# Patient Record
Sex: FEMALE | Race: OTHER | HISPANIC OR LATINO
[De-identification: names, ages, dates, MRNs, and addresses within clinical notes are randomized per-mention and may not be internally consistent; named-entity substitution may affect disease eponyms.]

---

## 2018-07-12 PROBLEM — E55.9 VITAMIN D DEFICIENCY: Status: ACTIVE | Noted: 2018-07-12

## 2018-07-16 ENCOUNTER — APPOINTMENT (OUTPATIENT)
Dept: HEMATOLOGY ONCOLOGY | Facility: CLINIC | Age: 48
End: 2018-07-16
Payer: COMMERCIAL

## 2018-07-16 ENCOUNTER — LABORATORY RESULT (OUTPATIENT)
Age: 48
End: 2018-07-16

## 2018-07-16 VITALS
TEMPERATURE: 98.8 F | HEART RATE: 83 BPM | BODY MASS INDEX: 37.38 KG/M2 | SYSTOLIC BLOOD PRESSURE: 124 MMHG | DIASTOLIC BLOOD PRESSURE: 77 MMHG | HEIGHT: 61 IN | WEIGHT: 198 LBS

## 2018-07-16 DIAGNOSIS — E55.9 VITAMIN D DEFICIENCY, UNSPECIFIED: ICD-10-CM

## 2018-07-16 DIAGNOSIS — Z86.79 PERSONAL HISTORY OF OTHER DISEASES OF THE CIRCULATORY SYSTEM: ICD-10-CM

## 2018-07-16 DIAGNOSIS — R79.89 OTHER SPECIFIED ABNORMAL FINDINGS OF BLOOD CHEMISTRY: ICD-10-CM

## 2018-07-16 DIAGNOSIS — D50.0 IRON DEFICIENCY ANEMIA SECONDARY TO BLOOD LOSS (CHRONIC): ICD-10-CM

## 2018-07-16 PROCEDURE — 36415 COLL VENOUS BLD VENIPUNCTURE: CPT

## 2018-07-16 PROCEDURE — 99205 OFFICE O/P NEW HI 60 MIN: CPT | Mod: 25

## 2018-07-16 PROCEDURE — 85025 COMPLETE CBC W/AUTO DIFF WBC: CPT

## 2018-07-17 LAB
ANION GAP SERPL CALC-SCNC: 16 MMOL/L
BUN SERPL-MCNC: 9 MG/DL
CALCIUM SERPL-MCNC: 8.7 MG/DL
CHLORIDE SERPL-SCNC: 102 MMOL/L
CO2 SERPL-SCNC: 22 MMOL/L
CREAT SERPL-MCNC: 0.78 MG/DL
CRP SERPL-MCNC: 0.5 MG/DL
DEPRECATED KAPPA LC FREE/LAMBDA SER: 0.64 RATIO
ERYTHROCYTE [SEDIMENTATION RATE] IN BLOOD BY WESTERGREN METHOD: 20 MM/HR
FERRITIN SERPL-MCNC: 74 NG/ML
FOLATE SERPL-MCNC: >20 NG/ML
GLUCOSE SERPL-MCNC: 104 MG/DL
HCT VFR BLD CALC: 35.3 %
HGB BLD-MCNC: 11.1 G/DL
IRON SATN MFR SERPL: 11 %
IRON SERPL-MCNC: 36 UG/DL
KAPPA LC CSF-MCNC: 1.98 MG/DL
KAPPA LC SERPL-MCNC: 1.27 MG/DL
M PROTEIN SPEC IFE-MCNC: NORMAL
MCHC RBC-ENTMCNC: 24.2 PG
MCHC RBC-ENTMCNC: 31.3 GM/DL
MCV RBC AUTO: 77.3 FL
PLATELET # BLD AUTO: 240 K/UL
POTASSIUM SERPL-SCNC: 3.7 MMOL/L
RBC # BLD: 4.57 M/UL
RBC # FLD: 18.1 %
SODIUM SERPL-SCNC: 140 MMOL/L
TIBC SERPL-MCNC: 319 UG/DL
UIBC SERPL-MCNC: 283 UG/DL
VIT B12 SERPL-MCNC: 251 PG/ML
WBC # FLD AUTO: 7.1 K/UL

## 2018-07-23 ENCOUNTER — APPOINTMENT (OUTPATIENT)
Dept: INFUSION THERAPY | Facility: CLINIC | Age: 48
End: 2018-07-23
Payer: COMMERCIAL

## 2018-07-23 VITALS
WEIGHT: 200 LBS | SYSTOLIC BLOOD PRESSURE: 120 MMHG | DIASTOLIC BLOOD PRESSURE: 77 MMHG | BODY MASS INDEX: 37.76 KG/M2 | HEART RATE: 69 BPM | TEMPERATURE: 98.8 F | HEIGHT: 61 IN

## 2018-07-23 PROCEDURE — 96372 THER/PROPH/DIAG INJ SC/IM: CPT | Mod: 59

## 2018-07-23 PROCEDURE — 96365 THER/PROPH/DIAG IV INF INIT: CPT

## 2018-07-23 PROCEDURE — 96367 TX/PROPH/DG ADDL SEQ IV INF: CPT

## 2018-07-30 ENCOUNTER — APPOINTMENT (OUTPATIENT)
Dept: INFUSION THERAPY | Facility: CLINIC | Age: 48
End: 2018-07-30
Payer: COMMERCIAL

## 2018-07-30 VITALS
HEART RATE: 67 BPM | SYSTOLIC BLOOD PRESSURE: 144 MMHG | DIASTOLIC BLOOD PRESSURE: 79 MMHG | BODY MASS INDEX: 37.57 KG/M2 | HEIGHT: 61 IN | WEIGHT: 199 LBS | TEMPERATURE: 98.3 F

## 2018-07-30 PROCEDURE — 96372 THER/PROPH/DIAG INJ SC/IM: CPT | Mod: 59,Q5

## 2018-07-30 PROCEDURE — 96365 THER/PROPH/DIAG IV INF INIT: CPT | Mod: Q5

## 2018-08-06 ENCOUNTER — APPOINTMENT (OUTPATIENT)
Dept: INFUSION THERAPY | Facility: CLINIC | Age: 48
End: 2018-08-06

## 2018-08-13 ENCOUNTER — APPOINTMENT (OUTPATIENT)
Dept: INFUSION THERAPY | Facility: CLINIC | Age: 48
End: 2018-08-13
Payer: COMMERCIAL

## 2018-08-13 VITALS
HEIGHT: 61 IN | TEMPERATURE: 98.4 F | WEIGHT: 197.13 LBS | HEART RATE: 64 BPM | DIASTOLIC BLOOD PRESSURE: 73 MMHG | SYSTOLIC BLOOD PRESSURE: 118 MMHG | BODY MASS INDEX: 37.22 KG/M2

## 2018-08-13 PROCEDURE — 96372 THER/PROPH/DIAG INJ SC/IM: CPT

## 2018-08-20 ENCOUNTER — APPOINTMENT (OUTPATIENT)
Dept: INFUSION THERAPY | Facility: CLINIC | Age: 48
End: 2018-08-20
Payer: COMMERCIAL

## 2018-08-20 ENCOUNTER — APPOINTMENT (OUTPATIENT)
Dept: INFUSION THERAPY | Facility: CLINIC | Age: 48
End: 2018-08-20

## 2018-08-20 VITALS
BODY MASS INDEX: 37.19 KG/M2 | HEART RATE: 63 BPM | SYSTOLIC BLOOD PRESSURE: 116 MMHG | WEIGHT: 197 LBS | DIASTOLIC BLOOD PRESSURE: 74 MMHG | HEIGHT: 61 IN | TEMPERATURE: 98.4 F

## 2018-08-20 PROCEDURE — 96372 THER/PROPH/DIAG INJ SC/IM: CPT

## 2018-08-27 ENCOUNTER — LABORATORY RESULT (OUTPATIENT)
Age: 48
End: 2018-08-27

## 2018-08-27 ENCOUNTER — APPOINTMENT (OUTPATIENT)
Dept: INFUSION THERAPY | Facility: CLINIC | Age: 48
End: 2018-08-27
Payer: COMMERCIAL

## 2018-08-27 ENCOUNTER — APPOINTMENT (OUTPATIENT)
Dept: HEMATOLOGY ONCOLOGY | Facility: CLINIC | Age: 48
End: 2018-08-27
Payer: COMMERCIAL

## 2018-08-27 VITALS
HEIGHT: 61 IN | BODY MASS INDEX: 37 KG/M2 | HEART RATE: 81 BPM | SYSTOLIC BLOOD PRESSURE: 118 MMHG | TEMPERATURE: 98.1 F | WEIGHT: 196 LBS | DIASTOLIC BLOOD PRESSURE: 73 MMHG

## 2018-08-27 LAB
HCT VFR BLD CALC: 41 %
HGB BLD-MCNC: 13 G/DL
MCHC RBC-ENTMCNC: 26.3 PG
MCHC RBC-ENTMCNC: 31.6 GM/DL
MCV RBC AUTO: 83.2 FL
PLATELET # BLD AUTO: 253 K/UL
RBC # BLD: 4.93 M/UL
RBC # FLD: 19.3 %
WBC # FLD AUTO: 8.1 K/UL

## 2018-08-27 PROCEDURE — 96372 THER/PROPH/DIAG INJ SC/IM: CPT | Mod: Q5

## 2018-08-27 PROCEDURE — 36415 COLL VENOUS BLD VENIPUNCTURE: CPT | Mod: Q5

## 2018-08-27 PROCEDURE — 85025 COMPLETE CBC W/AUTO DIFF WBC: CPT | Mod: Q5

## 2018-08-28 LAB
FERRITIN SERPL-MCNC: 155 NG/ML
IRON SATN MFR SERPL: 30 %
IRON SERPL-MCNC: 64 UG/DL
TIBC SERPL-MCNC: 216 UG/DL
UIBC SERPL-MCNC: 152 UG/DL
VIT B12 SERPL-MCNC: 552 PG/ML

## 2019-05-03 ENCOUNTER — APPOINTMENT (OUTPATIENT)
Age: 49
End: 2019-05-03
Payer: COMMERCIAL

## 2019-05-03 ENCOUNTER — LABORATORY RESULT (OUTPATIENT)
Age: 49
End: 2019-05-03

## 2019-05-03 VITALS
WEIGHT: 189.5 LBS | DIASTOLIC BLOOD PRESSURE: 76 MMHG | SYSTOLIC BLOOD PRESSURE: 126 MMHG | BODY MASS INDEX: 35.78 KG/M2 | TEMPERATURE: 97.9 F | HEIGHT: 61 IN | HEART RATE: 73 BPM

## 2019-05-03 DIAGNOSIS — D50.9 IRON DEFICIENCY ANEMIA, UNSPECIFIED: ICD-10-CM

## 2019-05-03 LAB
HCT VFR BLD CALC: 35.1 %
HGB BLD-MCNC: 11.1 G/DL
MCHC RBC-ENTMCNC: 26 PG
MCHC RBC-ENTMCNC: 31.7 GM/DL
MCV RBC AUTO: 81.8 FL
PLATELET # BLD AUTO: 284 K/UL
RBC # BLD: 4.29 M/UL
RBC # FLD: 13.8 %
WBC # FLD AUTO: 7.6 K/UL

## 2019-05-03 PROCEDURE — 99213 OFFICE O/P EST LOW 20 MIN: CPT | Mod: 25

## 2019-05-03 PROCEDURE — 85025 COMPLETE CBC W/AUTO DIFF WBC: CPT

## 2019-05-03 PROCEDURE — 36415 COLL VENOUS BLD VENIPUNCTURE: CPT

## 2019-05-03 RX ORDER — HYDROCHLOROTHIAZIDE 12.5 MG/1
12.5 CAPSULE ORAL
Qty: 30 | Refills: 0 | Status: DISCONTINUED | COMMUNITY
Start: 2019-01-22

## 2019-05-03 RX ORDER — FERROUS SULFATE TAB EC 325 MG (65 MG FE EQUIVALENT) 325 (65 FE) MG
325 (65 FE) TABLET DELAYED RESPONSE ORAL
Refills: 0 | Status: DISCONTINUED | COMMUNITY
End: 2019-05-03

## 2019-05-03 RX ORDER — VITAMIN C, CALCIUM, IRON, VITAMIN D3, VITAMIN E, THIAMIN, RIBOFLAVIN, NIACINAMIDE, VITAMIN B6, FOLIC ACID, IODINE, ZINC, COPPER, DOCUSATE SODIUM 120; 85; 30; 3; 20; 20; 1; 25; 2; 50; 159; 4.54; 150; 5; 400; 3.4 MG/1; MG/1; [IU]/1; MG/1; MG/1; MG/1; MG/1; MG/1; MG/1; MG/1; MG/1; MG/1; UG/1; MG/1; [IU]/1; MG/1
90-1 & 300 TABLET ORAL
Qty: 60 | Refills: 0 | Status: ACTIVE | COMMUNITY
Start: 2019-01-22

## 2019-05-03 NOTE — HISTORY OF PRESENT ILLNESS
[de-identified] : 49 F, , with iron deficiency microcytic anemia, treated with parenteral iron. [FreeTextEntry1] : Feraheme [de-identified] : Patient is returning for follow up. Her hemoglobin is 11.5 g/ dL, but ferritin has dropped to 5 mg/ mL. She is complaining of fatigue. She has received parenteral B12 and iron supplementation. Last dose of Feraheme was in July 2018. Patient continues to present regular, but heavy menses. She continues to work as a  at a local [employer]. Denies changes in weight or appetite.

## 2019-05-03 NOTE — REVIEW OF SYSTEMS
[Fatigue] : fatigue [Negative] : Integumentary [Recent Change In Weight] : ~T no recent weight change

## 2019-05-03 NOTE — RESULTS/DATA
[FreeTextEntry1] : March 20, 2019:\par WBC 5.4, hemoglobin 11.5 g/dL, hematocrit 36.7%, platelets 285,000, MCV 84.5, ferritin 5 ng / mL

## 2019-05-03 NOTE — ASSESSMENT
[FreeTextEntry1] : Ms. DEXTER 's questions were answered to her satisfaction. She expressed her understanding and willingness to comply with the above recommendations, and  will return to the office to review the results of the blood tests in 4-6 weeks.\par

## 2019-05-14 ENCOUNTER — APPOINTMENT (OUTPATIENT)
Age: 49
End: 2019-05-14
Payer: COMMERCIAL

## 2019-05-14 VITALS
BODY MASS INDEX: 35.78 KG/M2 | SYSTOLIC BLOOD PRESSURE: 119 MMHG | HEIGHT: 61 IN | WEIGHT: 189.5 LBS | HEART RATE: 70 BPM | TEMPERATURE: 98.3 F | DIASTOLIC BLOOD PRESSURE: 77 MMHG

## 2019-05-14 PROCEDURE — 96365 THER/PROPH/DIAG IV INF INIT: CPT

## 2019-05-21 ENCOUNTER — APPOINTMENT (OUTPATIENT)
Age: 49
End: 2019-05-21
Payer: COMMERCIAL

## 2019-05-21 VITALS
HEART RATE: 72 BPM | SYSTOLIC BLOOD PRESSURE: 116 MMHG | TEMPERATURE: 98 F | DIASTOLIC BLOOD PRESSURE: 74 MMHG | RESPIRATION RATE: 20 BRPM

## 2019-05-21 PROCEDURE — 96365 THER/PROPH/DIAG IV INF INIT: CPT | Mod: Q5

## 2019-06-18 ENCOUNTER — LABORATORY RESULT (OUTPATIENT)
Age: 49
End: 2019-06-18

## 2019-06-18 ENCOUNTER — APPOINTMENT (OUTPATIENT)
Age: 49
End: 2019-06-18
Payer: COMMERCIAL

## 2019-06-18 VITALS
BODY MASS INDEX: 34.81 KG/M2 | HEART RATE: 54 BPM | SYSTOLIC BLOOD PRESSURE: 124 MMHG | TEMPERATURE: 98 F | RESPIRATION RATE: 16 BRPM | WEIGHT: 184.38 LBS | DIASTOLIC BLOOD PRESSURE: 78 MMHG | HEIGHT: 61 IN

## 2019-06-18 LAB
HCT VFR BLD CALC: 41.4 %
HGB BLD-MCNC: 13 G/DL
MCHC RBC-ENTMCNC: 27.8 PG
MCHC RBC-ENTMCNC: 31.3 GM/DL
MCV RBC AUTO: 88.9 FL
PLATELET # BLD AUTO: 254 K/UL
RBC # BLD: 4.66 M/UL
RBC # FLD: 17.6 %
WBC # FLD AUTO: 7 K/UL

## 2019-06-18 PROCEDURE — 85025 COMPLETE CBC W/AUTO DIFF WBC: CPT

## 2019-06-18 PROCEDURE — 36415 COLL VENOUS BLD VENIPUNCTURE: CPT

## 2019-06-19 LAB
FERRITIN SERPL-MCNC: 318 NG/ML
IRON SATN MFR SERPL: 24 %
IRON SERPL-MCNC: 51 UG/DL
TIBC SERPL-MCNC: 211 UG/DL
UIBC SERPL-MCNC: 160 UG/DL

## 2020-08-18 ENCOUNTER — APPOINTMENT (OUTPATIENT)
Dept: UROLOGY | Facility: CLINIC | Age: 50
End: 2020-08-18
Payer: MEDICAID

## 2020-08-18 VITALS
WEIGHT: 195 LBS | TEMPERATURE: 97.3 F | HEART RATE: 66 BPM | OXYGEN SATURATION: 98 % | SYSTOLIC BLOOD PRESSURE: 137 MMHG | DIASTOLIC BLOOD PRESSURE: 85 MMHG | HEIGHT: 61 IN | BODY MASS INDEX: 36.82 KG/M2

## 2020-08-18 DIAGNOSIS — R31.21 ASYMPTOMATIC MICROSCOPIC HEMATURIA: ICD-10-CM

## 2020-08-18 PROCEDURE — 99204 OFFICE O/P NEW MOD 45 MIN: CPT

## 2020-08-18 NOTE — PHYSICAL EXAM
[General Appearance - Well Developed] : well developed [Well Groomed] : well groomed [Normal Appearance] : normal appearance [General Appearance - Well Nourished] : well nourished [General Appearance - In No Acute Distress] : no acute distress [Edema] : no peripheral edema [Exaggerated Use Of Accessory Muscles For Inspiration] : no accessory muscle use [Respiration, Rhythm And Depth] : normal respiratory rhythm and effort [Abdomen Soft] : soft [Normal Station and Gait] : the gait and station were normal for the patient's age [Costovertebral Angle Tenderness] : no ~M costovertebral angle tenderness [Abdomen Tenderness] : non-tender [Urinary Bladder Findings] : the bladder was normal on palpation [] : no rash [No Focal Deficits] : no focal deficits [Affect] : the affect was normal [Mood] : the mood was normal [Oriented To Time, Place, And Person] : oriented to person, place, and time [No Palpable Adenopathy] : no palpable adenopathy [Not Anxious] : not anxious

## 2020-08-20 NOTE — ASSESSMENT
[FreeTextEntry1] : Patient presents with asymptomatic microscopic hematuria. Labs and CAT scan are ordered. She will follow up in the office for a cystoscopy.

## 2020-08-20 NOTE — HISTORY OF PRESENT ILLNESS
[FreeTextEntry1] : This patient presents with a complaint of asymptomatic microscopic hematuria. She has no lower tract symptoms. She was found to have microscopic hematuria on her routine urinalysis with her primary care physician.

## 2020-08-22 DIAGNOSIS — Z01.818 ENCOUNTER FOR OTHER PREPROCEDURAL EXAMINATION: ICD-10-CM

## 2020-08-26 ENCOUNTER — APPOINTMENT (OUTPATIENT)
Dept: DISASTER EMERGENCY | Facility: CLINIC | Age: 50
End: 2020-08-26

## 2020-08-27 LAB — SARS-COV-2 N GENE NPH QL NAA+PROBE: NOT DETECTED

## 2020-08-31 ENCOUNTER — RESULT REVIEW (OUTPATIENT)
Age: 50
End: 2020-08-31

## 2020-09-16 ENCOUNTER — APPOINTMENT (OUTPATIENT)
Dept: UROLOGY | Facility: CLINIC | Age: 50
End: 2020-09-16

## 2020-10-20 ENCOUNTER — APPOINTMENT (OUTPATIENT)
Dept: UROLOGY | Facility: CLINIC | Age: 50
End: 2020-10-20

## 2020-10-28 ENCOUNTER — APPOINTMENT (OUTPATIENT)
Dept: ANTEPARTUM | Facility: CLINIC | Age: 50
End: 2020-10-28

## 2020-12-08 ENCOUNTER — ASOB RESULT (OUTPATIENT)
Age: 50
End: 2020-12-08

## 2020-12-08 ENCOUNTER — APPOINTMENT (OUTPATIENT)
Dept: ANTEPARTUM | Facility: CLINIC | Age: 50
End: 2020-12-08
Payer: MEDICAID

## 2020-12-08 PROCEDURE — 76830 TRANSVAGINAL US NON-OB: CPT

## 2020-12-08 PROCEDURE — 76856 US EXAM PELVIC COMPLETE: CPT | Mod: 59

## 2020-12-08 PROCEDURE — 99072 ADDL SUPL MATRL&STAF TM PHE: CPT

## 2021-06-08 ENCOUNTER — APPOINTMENT (OUTPATIENT)
Dept: ANTEPARTUM | Facility: CLINIC | Age: 51
End: 2021-06-08
Payer: MEDICAID

## 2021-06-08 ENCOUNTER — ASOB RESULT (OUTPATIENT)
Age: 51
End: 2021-06-08

## 2021-06-08 PROCEDURE — 76830 TRANSVAGINAL US NON-OB: CPT

## 2021-06-08 PROCEDURE — 76856 US EXAM PELVIC COMPLETE: CPT | Mod: 59

## 2023-07-18 ENCOUNTER — EMERGENCY (EMERGENCY)
Facility: HOSPITAL | Age: 53
LOS: 0 days | Discharge: HOME | End: 2023-07-18
Attending: STUDENT IN AN ORGANIZED HEALTH CARE EDUCATION/TRAINING PROGRAM
Payer: MEDICAID

## 2023-07-18 VITALS — WEIGHT: 186.07 LBS | HEIGHT: 63 IN

## 2023-07-18 VITALS
HEART RATE: 72 BPM | SYSTOLIC BLOOD PRESSURE: 161 MMHG | TEMPERATURE: 97 F | DIASTOLIC BLOOD PRESSURE: 91 MMHG | OXYGEN SATURATION: 99 % | RESPIRATION RATE: 19 BRPM
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
## 2023-07-18 DIAGNOSIS — I10 ESSENTIAL (PRIMARY) HYPERTENSION: ICD-10-CM

## 2023-07-18 DIAGNOSIS — Z23 ENCOUNTER FOR IMMUNIZATION: ICD-10-CM
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
## 2023-07-18 DIAGNOSIS — I83.891 VARICOSE VEINS OF RIGHT LOWER EXTREMITY WITH OTHER COMPLICATIONS: ICD-10-CM

## 2023-07-18 PROCEDURE — 99284 EMERGENCY DEPT VISIT MOD MDM: CPT | Mod: 25

## 2023-07-18 PROCEDURE — 99283 EMERGENCY DEPT VISIT LOW MDM: CPT | Mod: 25

## 2023-07-18 PROCEDURE — 12001 RPR S/N/AX/GEN/TRNK 2.5CM/<: CPT

## 2023-07-18 PROCEDURE — 90715 TDAP VACCINE 7 YRS/> IM: CPT

## 2023-07-18 PROCEDURE — 90471 IMMUNIZATION ADMIN: CPT

## 2023-07-18 RX ORDER — TETANUS TOXOID, REDUCED DIPHTHERIA TOXOID AND ACELLULAR PERTUSSIS VACCINE, ADSORBED 5; 2.5; 8; 8; 2.5 [IU]/.5ML; [IU]/.5ML; UG/.5ML; UG/.5ML; UG/.5ML
0.5 SUSPENSION INTRAMUSCULAR ONCE
Refills: 0 | Status: COMPLETED | OUTPATIENT
Start: 2023-07-18 | End: 2023-07-18

## 2023-07-18 RX ORDER — ACETAMINOPHEN 500 MG
650 TABLET ORAL ONCE
Refills: 0 | Status: COMPLETED | OUTPATIENT
Start: 2023-07-18 | End: 2023-07-18

## 2023-07-18 RX ADMIN — TETANUS TOXOID, REDUCED DIPHTHERIA TOXOID AND ACELLULAR PERTUSSIS VACCINE, ADSORBED 0.5 MILLILITER(S): 5; 2.5; 8; 8; 2.5 SUSPENSION INTRAMUSCULAR at 11:22

## 2023-07-18 RX ADMIN — Medication 650 MILLIGRAM(S): at 11:22

## 2023-07-18 NOTE — ED ADULT TRIAGE NOTE - CHIEF COMPLAINT QUOTE
pt presents to ED with foot and ankle pain s/p being hit with a door. pt endorses small laceration to interior right ankle. bleeding controlled in triage. endorses incident happened at approximately 0900.

## 2023-07-18 NOTE — ED ADULT NURSE NOTE - NSFALLUNIVINTERV_ED_ALL_ED
Bed/Stretcher in lowest position, wheels locked, appropriate side rails in place/Call bell, personal items and telephone in reach/Instruct patient to call for assistance before getting out of bed/chair/stretcher/Non-slip footwear applied when patient is off stretcher/[place] to call system/Physically safe environment - no spills, clutter or unnecessary equipment/Purposeful proactive rounding/Room/bathroom lighting operational, light cord in reach

## 2023-07-18 NOTE — ED STATDOCS - OBJECTIVE STATEMENT
: [id number]  54 y/o female w/ PMHx of HTN presents to the ED c/o R ankle laceration, bleeding, pain and bruising s/p hitting it with the corner of a door around 9am today. Unsure when her last tdap was. Pt is able to ambulate on her own.

## 2023-07-18 NOTE — ED STATDOCS - CLINICAL SUMMARY MEDICAL DECISION MAKING FREE TEXT BOX
Pt hit her foot on a door this morning, now with small chunk of skin missing that was bleeding, likely a varicose vein, bleeding now controlled. Plan: update tdap, wound care and d/c with return precautions for signs/symptoms of infection.

## 2023-07-18 NOTE — ED STATDOCS - NS ED ATTENDING STATEMENT MOD
This was a shared visit with the ROD. I reviewed and verified the documentation and independently performed the documented:

## 2023-07-18 NOTE — ED STATDOCS - ATTENDING APP SHARED VISIT CONTRIBUTION OF CARE
I, Adán Burns, DO personally saw the patient with ROD.  I have personally performed a face to face diagnostic evaluation on this patient.  I have reviewed the ROD note and agree with the history, exam, and plan of care, except as noted.  I personally saw the patient and performed a substantive portion of the visit including all aspects of the medical decision making.

## 2023-07-18 NOTE — ED STATDOCS - PROGRESS NOTE DETAILS
pt varicose vein dressed with Surgifoam and pressure dressing and advised to fu with pmd if needed. pt has no active bleeding in the ed. pt ambulating with no difficulty. pt well appearing on dc and agrees with plan. -Michelle Jung PA-C

## 2023-07-18 NOTE — ED STATDOCS - PATIENT PORTAL LINK FT
You can access the FollowMyHealth Patient Portal offered by [facility] by registering at the following website: [email or web address]. By joining [employer]’s FollowMyHealth portal, you will also be able to view your health information using other applications (apps) compatible with our system.

## 2023-07-29 ENCOUNTER — EMERGENCY (EMERGENCY)
Facility: HOSPITAL | Age: 53
LOS: 0 days | Discharge: HOME | End: 2023-07-29
Attending: STUDENT IN AN ORGANIZED HEALTH CARE EDUCATION/TRAINING PROGRAM
Payer: MEDICAID

## 2023-07-29 VITALS
DIASTOLIC BLOOD PRESSURE: 78 MMHG | TEMPERATURE: 99 F | SYSTOLIC BLOOD PRESSURE: 151 MMHG | OXYGEN SATURATION: 99 % | RESPIRATION RATE: 18 BRPM | HEART RATE: 65 BPM

## 2023-07-29 VITALS — WEIGHT: 186.07 LBS | HEIGHT: 63 IN

## 2023-07-29 DIAGNOSIS — I83.891 VARICOSE VEINS OF RIGHT LOWER EXTREMITY WITH OTHER COMPLICATIONS: ICD-10-CM

## 2023-07-29 DIAGNOSIS — I10 ESSENTIAL (PRIMARY) HYPERTENSION: ICD-10-CM

## 2023-07-29 PROCEDURE — 99282 EMERGENCY DEPT VISIT SF MDM: CPT | Mod: 25

## 2023-07-29 PROCEDURE — 12001 RPR S/N/AX/GEN/TRNK 2.5CM/<: CPT

## 2023-07-29 PROCEDURE — 99284 EMERGENCY DEPT VISIT MOD MDM: CPT | Mod: 25

## 2023-07-29 NOTE — ED PROCEDURE NOTE - PROCEDURE ADDITIONAL DETAILS
Bacitracin + Surgifoam + Telfa + kerlix applied. +Hemostasis. Patient stable, tolerated well. ~Juan Ramon Boo PA-C

## 2023-07-29 NOTE — ED ADULT TRIAGE NOTE - GLASGOW COMA SCALE: BEST MOTOR RESPONSE, MLM
(M6) obeys commands
decreased ability to use arms for pushing/pulling/decreased ability to use legs for bridging/pushing

## 2023-07-29 NOTE — ED STATDOCS - CLINICAL SUMMARY MEDICAL DECISION MAKING FREE TEXT BOX
53-year-old female was seen here in the ER last week for 1 cm laceration to the right foot presents for repeat episode of bleeding.  Patient noted to have high-pressure bleeding, likely varicose vein versus small arterial insult.  Patient not on any anticoagulation.  Will place 1 stitch to control bleeding, discharge home. 53-year-old female was seen here in the ER last week for 1 cm laceration to the right foot presents for repeat episode of bleeding.  Patient noted to have high-pressure bleeding, likely varicose vein versus small arterial insult.  Patient not on any anticoagulation.  Will place 1 stitch to control bleeding, discharge home.    PA note: Bleeding varicose vein repaired with 3 3-0 plain gut sutures, see procedure note. All labwork/radiology results discussed in detail with patient. Patient re-examined and re-evaluated. Patient feels much better at this time. ED evaluation, Diagnosis and management discussed with the patient in detail. Workup results discussed with ED attending, OK to dc home. Close vascular MD follow up encouraged, aftercare to assist with scheduling appointment ASAP. Strict ED return instructions discussed in detail and patient given the opportunity to ask any questions about their discharge diagnosis and instructions. Patient verbalized understanding. ~ Juan Ramon Boo PA-C

## 2023-07-29 NOTE — ED STATDOCS - PATIENT PORTAL LINK FT
You can access the FollowMyHealth Patient Portal offered by [facility] by registering at the following website: [email or web address]. By joining [facility]’s FollowMyHealth portal, you will also be able to view your health information using other applications (apps) compatible with our system.

## 2023-07-29 NOTE — ED ADULT NURSE NOTE - CHIEF COMPLAINT QUOTE
ID: [id number] - PT C/O RIGHT ANKLE PAIN/BLEEDING STARTED THIS MORNING, PT REPORTS ANKLE/FOOT INJURY 1 WEEK AGO AT WORK, HIT ANKLE/FOOT TO EDGE OF DOOR.  UNABLE TO EVAL AFFECTED AREA.

## 2023-07-29 NOTE — ED ADULT TRIAGE NOTE - CHIEF COMPLAINT QUOTE
ID: [id number] - PT C/O RIGHT ANKLE PAIN/BLEEDING STARTED THIS MORNING, PT REPORTS ANKLE/FOOT INJURY 1 WEEK AGO AT WORK, HIT ANKLE/FOOT TO EDGE OF DOOR.  UNABLE TO EVAL AFFECTED AREA.

## 2023-07-29 NOTE — ED STATDOCS - CARE PROVIDER_API CALL
Gerardo Padilla  Vascular Surgery  [address], [place] [address]  Phone: [phone]  Fax: [phone]  Follow Up Time: Urgent

## 2023-07-29 NOTE — ED STATDOCS - SKIN, MLM
RIGHT ANKLE: +Multiple small varicose veins. Small area of venous oozing of blood noted right medial ankle varicose vein. ~Juan Ramon Boo PA-C

## 2023-07-29 NOTE — ED STATDOCS - NS ED ROS FT
GENERAL: no fever, no chills, no weight loss  EYES: no change in vision, no irritation, no discharge, no redness, no pain  HEENT: no trouble swallowing or speaking, no throat pain, no ear pain  CARDIAC: no chest pain, no palpitations   PULMONARY: no cough, no shortness of breath, no wheezing  GI: no abdominal pain, no nausea, no vomiting, no diarrhea, no constipation, no melena, no hematochezia, no hematemesis  : no changes in urination, no dysuria, no frequency, no hematuria, no discharge  SKIN: no rashes, right ankle with small laceration with active bleeding.   NEURO: no headache, no numbness, no weakness  MSK: +right ankle pain, no muscle pain, no back pain, no calf pain

## 2023-07-29 NOTE — ED STATDOCS - NSFOLLOWUPINSTRUCTIONS_ED_ALL_ED_FT
Venas varicosas que sangran  Bleeding Varicose Veins  Las venas varicosas son aquellas que se ingram agrandado y tornado sinuosas debido a las válvulas dañadas en las venas. Las válvulas de las venas ayudan al retorno de la shannon desde la vena hacia el corazón. Si estas válvulas se dañan, la shannon retrocede y regresa a las venas. [place] puede hacer que las venas revienten y sangren debido al aumento de la presión interior.    El sangrado de las venas varicosas puede ser interno o externo. El sangrado externo es un sangrado fuera de la piel que puede deberse a un bryce o rasguño en la piel que está sobre la vena varicosa. El sangrado interno se produce debajo de la piel y a menudo es el resultado de un traumatismo directo en la vena varicosa, ke por tropezar, caer o golpear la vena con un mueble.    El sangrado intradérmico es otra forma de sangrado que puede ocurrir en las venas varicosas pequeñas a las que se llama arañas. En esta situación, la ruptura de la araña se produce debajo de la superficie de la piel, creando un moretón aliya violáceo que puede verse a través de la piel. S[place]etha problema suele ser leve y generalmente mejora por sí solo sin tratamiento.    ¿Cuáles son las causas?  Esta afección puede ser causada por un bryce o un golpe directo en la piel que está sobre la vena varicosa. En algunos casos, el sangrado puede producirse sin traumatismo directo en la vena. [place] puede deberse a lo siguiente:  Adelgazamiento y dilatación de la piel que recubre las venas varicosas (hipotonía).  Martin de las venas finas y débiles.  Hipertensión arterial en las venas, debido a la acumulación de shannon que normalmente debería retornar al corazón.  Indira protuberancia en la pelvis (bulto pélvico) que afecta las venas de las piernas.  Embarazo y parto.  Coágulos sanguíneos, especialmente en las venas profundas (tromboflebitis).  ¿Qué incrementa el riesgo?  Es más probable que tenga esta afección si:  Tiene antecedentes familiares de venas varicosas.  Está de pie nader largos períodos de tiempo.  Está embarazada o tuvo un embarazo.  Tiene sobrepeso.  Usa pastillas anticonceptivas (anticonceptivos orales).  Tiene un estilo de hitesh inactivo (sedentario).  Tiene antecedentes de trombosis venosa profunda (TVP).  ¿Cuáles son los signos o síntomas?  Si el sangrado se produce internamente, o debajo de la piel, los síntomas pueden incluir:  Kimberly de color aliya o púrpura en la piel que se extiende hacia fuera de las venas.  Picazón y cambio de color en la piel.  Pesadez, dolor punzante y pulsante, y calambres en las piernas, especialmente después de estar de pie por largos períodos, por el uso de ropa apretada o por sentir calor.  Sequedad intensa en la piel (eczema varicoso).  Sensación de ardor.  Mareos y, a veces, desmayos.  El sangrado externo se produce en la superficie de la piel, con mayor frecuencia después de un bryce, rasguño, moretón u otro traumatismo físico. Cuando esto ocurre, es crucial ejercer presión sobre la vena y detener el sangrado.    ¿Cómo se diagnostica?  Esta afección puede diagnosticarse en función de los síntomas, los que incluyen cuándo notó el sangrado o sintió dolor por primera vez.    ¿Cómo se trata?  El tratamiento puede incluir:  Detener el sangrado aplicando presión sobre la vena con indira toalla, sulma o venda limpias.  Detener la hinchazón aplicando presión (compresión) en la kimberly nader un período de tiempo más prolongado. Para esto se aplican vendas elásticas o se usan medias de compresión.  Levantar (elevar) la pierna por encima del nivel del corazón, nader 30 minutos algunas veces al día.  Siga estas instrucciones en espinoza casa:  Compression stockings on a person's lower legs.  Medicamentos    [place] y use los medicamentos y las cremas de venta mickey y recetados solamente ke se lo haya indicado el médico.  Si le recetaron indira crema antibiótica, úsela ke se lo haya indicado el médico. No deje de usar el antibiótico aunque la afección mejore.  Estilo de hitesh    A couple holding hands and walking.  No consuma ningún producto que contenga nicotina o tabaco. Estos productos incluyen cigarrillos, tabaco para mascar y aparatos de vapeo, ke los cigarrillos electrónicos. Si necesita ayuda para dejar de fumar, consulte al médico.  Realice ejercicio con regularidad y danielle los ejercicios ke se lo haya indicado el médico.  Si debe perder peso, hable con espinoza médico.  Instrucciones generales    Use medias de compresión o colóquese vendas elásticas o cualquier vendaje ke se lo haya indicado el médico. Estas ayudan a evitar la formación de coágulos de shannon y a reducir la hinchazón de las piernas.  Trate de no estar sentado o de pie nader largos períodos. Si necesita estar sentado o de pie por mucho tiempo, muévase con frecuencia para mantener el flujo sanguíneo (circulación).  Eleve las piernas por encima del nivel del corazón nader 30 minutos 4 veces al día, o con la frecuencia que le hayan indicado. Para hacerlo, recuéstese con las piernas apoyadas sobre indira almohada o almohadón para que los pies permanezcan por encima del nivel del corazón. Hacer esto regularmente puede ayudar a evitar más sangrado.  Revise la piel todos los días para detectar nuevas llagas y signos de sangrado.  Evite usar tacos altos y ropa apretada, especialmente en las extremidades y la cintura.  Tenga cuidado en situaciones en las que se podría lastimar las piernas, por ejemplo cuando se rasura o trabaja en el jardín. [place] puede ayudar a evitar el sangrado.  Comuníquese con un médico si:  Las venas sangran por encima o por debajo de la piel.  Espinoza dolor empeora.  La kimberly en torno a la vena varicosa está ??caliente, mikhail o sensible al tacto.  Aparecen llegas nuevas o indira erupción cutánea cerca de las venas varicosas.  Tiene indira llaga que no se marcie, se infecta o se agranda.  Tiene un líquido amarillento que huele mal, que emana del lugar donde estaba el sangrando externo.  Tiene fiebre.  Solicite ayuda de inmediato si:  Siente dolor en el pecho.  Tiene dificultad para respirar.  Siente dolor intenso en la pierna.  Las piernas y los pies adquieren un color aliya o marcos.  Las piernas se hinchan y se endurecen.  Tiene sangrado de las venas varicosas que no se detiene.  Tiene mareos o se ha desmayado.  Resumen  Las venas varicosas son aquellas que se ingram agrandado y tornado sinuosas debido a las válvulas dañadas en las venas. Pueden sangrar debajo de la piel (internamente) o en la superficie (externamente).  El tratamiento puede incluir ejercer presión sobre la vena, elevación de las piernas, uso de medias de compresión y cambios en el estilo de hitesh.  Debería hacer actividad física regularmente, mantener un peso saludable y evitar fumar.  Controle espinoza piel todos los días para detectar nuevas llagas, signos de sangrado u otros problemas.  Esta información no tiene ke fin reemplazar el consejo del médico. Asegúrese de hacerle al médico cualquier pregunta que tenga.      Care For Your Absorbable Stitches    WHAT YOU NEED TO KNOW:    What are absorbable stitches? Absorbable stitches, or sutures, are used to close cuts or wounds. These stitches are absorbed by your body, or fall off on their own within days or weeks. They do not need to be removed.      How do I care for my absorbable stitches?    Protect the stitches. Wear protective clothing over the stitches, and protect the area from sunlight. Do not place pressure on your wound. This could open your wound and increase your risk for an infection.    Clean your wound as directed. Carefully wash the wound with soap and water. Gently dry the area and put on new, clean bandages as directed. Change your bandages when they get wet or dirty. Check your wound for signs of infection when you clean it. Signs include redness, swelling, and pus.    Keep the area dry as directed. Wait 12 to 24 hours after you receive your stitches before you take a shower. Take showers instead of baths. Do not take a bath or swim. Your healthcare provider will give you instructions for bathing with your stitches.  What can I do to help my wound heal?    Elevate your wound. Keep your wound above the level of your heart as often as you can. This will help decrease swelling and pain. Prop the area on pillows or blankets, if possible, to keep it elevated comfortably.    Limit activity. Do not stretch the skin around your wound. This will help prevent bleeding and swelling.  When should I seek immediate care?    Your wound comes apart.    You have red streaks around your wound.    You have swollen or painful lymph nodes.    Blood soaks through your bandage.  When should I contact my healthcare provider?    You have signs of an infection, such as increased redness, pain, swelling, or pus.    You have a fever.    Your stitches do not absorb when your healthcare provider says they should.    You have questions or concerns about your condition or care.  CARE AGREEMENT:    You have the right to help plan your care. Learn about your health condition and how it may be treated. Discuss treatment options with your healthcare providers to decide what care you want to receive. You always have the right to refuse treatment.

## 2023-07-29 NOTE — ED STATDOCS - PROGRESS NOTE DETAILS
PA note: All labwork/radiology results discussed in detail with patient. Patient re-examined and re-evaluated. Patient feels much better at this time. ED evaluation, Diagnosis and management discussed with the patient in detail. Workup results discussed with ED attending, OK to [place] home. Close PMD follow up encouraged, aftercare to assist with scheduling appointment ASAP. Strict ED return instructions discussed in detail and patient given the opportunity to ask any questions about their discharge diagnosis and instructions. Patient verbalized understanding. ~ Juan Ramon Boo PA-C 52 yo female w/PMHx of HTN presents to the ED c/o right ankle pain/active bleeding. Pt states that last week she was seen here at ED for an injury to her right ankle. She noted that this morning the wound began to bleed and decided to come to the ED for evaluation. Last week, pt cut her ankle on a metal door that caused active bleeding. Pt did not need stitches at that time. Pt endorses pain at the site. Denies fevers but does endorse having chills. Pt states that she has some numbness to the area. Pt doesn't usually have the area wrapped up but wrapped it today when she saw the bleeding. Pt is not on blood thinners. PA: Patient is a 54 yo female w/PMHx of HTN who presents to [facility] c/o bleeding varicose vein from right ankle. Pt is not on blood thinners. ~Juan Ramon Boo PA-C PA note: Bleeding varicose vein repaired with 3 3-0 plain gut sutures, see procedure note. All labwork/radiology results discussed in detail with patient. Patient re-examined and re-evaluated. Patient feels much better at this time. ED evaluation, Diagnosis and management discussed with the patient in detail. Workup results discussed with ED attending, OK to [place] home. Close vascular MD follow up encouraged, aftercare to assist with scheduling appointment ASAP. Strict ED return instructions discussed in detail and patient given the opportunity to ask any questions about their discharge diagnosis and instructions. Patient verbalized understanding. ~ Juan Ramon Boo PA-C

## 2023-07-29 NOTE — ED STATDOCS - PHYSICAL EXAMINATION
GENERAL: A&Ox4, non-toxic appearing, no acute distress  HEENT: NCAT, EOMI, oral mucosa moist, normal conjunctiva  RESP: CTAB, no respiratory distress, no wheezes/rhonchi/rales, speaking in full sentences  CV: RRR, no murmurs/rubs/gallops  ABDOMEN: soft, non-tender, non-distended, no guarding, no CVA tenderness  MSK: no visible deformities  NEURO: no focal sensory or motor deficits, CN 2-12 grossly intact  SKIN: right ankle with healing 1cm laceration with small area of high pressure bleeding h  PSYCH: normal affect Attending: GENERAL: A&Ox4, non-toxic appearing, no acute distress  HEENT: NCAT, EOMI, oral mucosa moist, normal conjunctiva  RESP: CTAB, no respiratory distress, no wheezes/rhonchi/rales, speaking in full sentences  CV: RRR, no murmurs/rubs/gallops  ABDOMEN: soft, non-tender, non-distended, no guarding, no CVA tenderness  MSK: no visible deformities  NEURO: no focal sensory or motor deficits, CN 2-12 grossly intact  SKIN: right ankle with healing 1cm laceration with small area of high pressure bleeding h  PSYCH: normal affect

## 2023-07-29 NOTE — ED STATDOCS - OBJECTIVE STATEMENT
54 yo female w/PMHx of HTN presents to the ED c/o right ankle pain/active bleeding. Pt states that last week she was seen here at ED for an injury to her right ankle. She noted that this morning the wound began to bleed and decided to come to the ED for evaluation. Last week, pt cut her ankle on a metal door that caused active bleeding. Pt did not need stitches at that time. Pt endorses pain at the site. Denies fevers but does endorse having chills. Pt states that she has some numbness to the area. Pt doesn't usually have the area wrapped up but wrapped it today when she saw the bleeding. Pt is not on blood thinners.      used, id# [id number]

## 2023-07-29 NOTE — ED STATDOCS - ATTENDING APP SHARED VISIT CONTRIBUTION OF CARE
I, Cem Hatch DO, personally saw the patient with ACP.  I have personally performed a face to face diagnostic evaluation on this patient.  I have reviewed the ACP note and agree with the history, exam, and plan of care, except as noted.   The initial assessment was performed by myself and then the patient was handed off to the ACP. The patient was followed and re-evaluated by the ACP. All labs, imaging and procedures were evaluated and performed by the ACP and I was available for consultation if any questions in the patients care came up.

## 2023-07-31 ENCOUNTER — NON-APPOINTMENT (OUTPATIENT)
Age: 53
End: 2023-07-31

## 2023-08-02 ENCOUNTER — APPOINTMENT (OUTPATIENT)
Dept: VASCULAR SURGERY | Facility: CLINIC | Age: 53
End: 2023-08-02
Payer: MEDICAID

## 2023-08-02 VITALS
HEART RATE: 66 BPM | HEIGHT: 61 IN | DIASTOLIC BLOOD PRESSURE: 103 MMHG | BODY MASS INDEX: 37 KG/M2 | WEIGHT: 196 LBS | SYSTOLIC BLOOD PRESSURE: 147 MMHG | OXYGEN SATURATION: 98 % | TEMPERATURE: 97.3 F

## 2023-08-02 PROCEDURE — 99213 OFFICE O/P EST LOW 20 MIN: CPT

## 2023-08-09 ENCOUNTER — APPOINTMENT (OUTPATIENT)
Dept: VASCULAR SURGERY | Facility: CLINIC | Age: 53
End: 2023-08-09
Payer: MEDICAID

## 2023-08-09 PROCEDURE — 29580 STRAPPING UNNA BOOT: CPT

## 2023-08-09 PROCEDURE — 93971 EXTREMITY STUDY: CPT | Mod: RT

## 2023-08-14 NOTE — ASSESSMENT
[FreeTextEntry1] : 52yo female with chronic venous insufficiency and symptomatic varicose veins now with wound that developed after an injury/laceration to the medial right ankle -she will need a venous duplex with VI study to better assess function of veins (will plan for next week) -unna boot dressing applied to stay on for 1 week until next visit -patient will not be able to work for at least few weeks until wound is completely healed as standing with propagate her symptoms and delay healing -follow up in 1 week; if severe pain, itching, fever or drainage instructed to call office and go to ED

## 2023-08-14 NOTE — PHYSICAL EXAM
[JVD] : no jugular venous distention  [Normal Breath Sounds] : Normal breath sounds [Normal Rate and Rhythm] : normal rate and rhythm [2+] : left 2+ [Ankle Swelling (On Exam)] : present [Ankle Swelling On The Right] : of the right ankle [Varicose Veins Of Lower Extremities] : bilaterally [] : of the right leg [Ankle Swelling On The Left] : moderate [de-identified] : well appearing ambulates unassisted [de-identified] : wnl [FreeTextEntry1] : Right medial malleolar wound deep to dermis; clean base no drainage; removed sutures; Unna boot dressing applied CEAP C-6

## 2023-08-14 NOTE — HISTORY OF PRESENT ILLNESS
[FreeTextEntry1] : 52yo female with known longstanding chronic venous insufficiency and varicose veins b/l comes in for a new wound on the right leg. Patient was at work and had a laceration to her right medial ankle and this has formed a wound. She went to the ED and they did wound care and put sutures in the area. Since that time she has developed a wound in the area and has been doing her own local wound care. She has pain in the area but denies fowl smelling drainage, fever and chills. Previous venous procedure includes what sounds like an ablation of the GSV on the left leg; has never had an intervention on the right leg. Wears compression stockings regularly but has stopped given this new injury and wound.

## 2023-08-16 ENCOUNTER — NON-APPOINTMENT (OUTPATIENT)
Age: 53
End: 2023-08-16

## 2023-08-16 ENCOUNTER — APPOINTMENT (OUTPATIENT)
Dept: VASCULAR SURGERY | Facility: CLINIC | Age: 53
End: 2023-08-16
Payer: MEDICAID

## 2023-08-16 PROCEDURE — 29580 STRAPPING UNNA BOOT: CPT

## 2023-08-23 ENCOUNTER — APPOINTMENT (OUTPATIENT)
Dept: VASCULAR SURGERY | Facility: CLINIC | Age: 53
End: 2023-08-23
Payer: MEDICAID

## 2023-08-23 PROCEDURE — 29580 STRAPPING UNNA BOOT: CPT

## 2023-09-14 ENCOUNTER — APPOINTMENT (OUTPATIENT)
Dept: VASCULAR SURGERY | Facility: CLINIC | Age: 53
End: 2023-09-14
Payer: MEDICAID

## 2023-09-14 VITALS
WEIGHT: 196 LBS | HEIGHT: 61 IN | OXYGEN SATURATION: 98 % | SYSTOLIC BLOOD PRESSURE: 156 MMHG | BODY MASS INDEX: 37 KG/M2 | DIASTOLIC BLOOD PRESSURE: 90 MMHG | HEART RATE: 72 BPM

## 2023-09-14 PROCEDURE — 36475 ENDOVENOUS RF 1ST VEIN: CPT | Mod: RT

## 2023-09-19 ENCOUNTER — APPOINTMENT (OUTPATIENT)
Dept: VASCULAR SURGERY | Facility: CLINIC | Age: 53
End: 2023-09-19
Payer: MEDICAID

## 2023-09-19 PROCEDURE — 93971 EXTREMITY STUDY: CPT

## 2023-10-02 ENCOUNTER — APPOINTMENT (OUTPATIENT)
Dept: VASCULAR SURGERY | Facility: CLINIC | Age: 53
End: 2023-10-02
Payer: MEDICAID

## 2023-10-02 VITALS
SYSTOLIC BLOOD PRESSURE: 147 MMHG | HEIGHT: 61 IN | WEIGHT: 196 LBS | HEART RATE: 71 BPM | BODY MASS INDEX: 37 KG/M2 | OXYGEN SATURATION: 96 % | DIASTOLIC BLOOD PRESSURE: 91 MMHG

## 2023-10-02 PROCEDURE — 99212 OFFICE O/P EST SF 10 MIN: CPT

## 2023-11-11 ENCOUNTER — EMERGENCY (EMERGENCY)
Facility: HOSPITAL | Age: 53
LOS: 0 days | Discharge: HOME | End: 2023-11-11
Attending: EMERGENCY MEDICINE
Payer: MEDICAID

## 2023-11-11 VITALS
RESPIRATION RATE: 18 BRPM | DIASTOLIC BLOOD PRESSURE: 88 MMHG | HEART RATE: 70 BPM | SYSTOLIC BLOOD PRESSURE: 155 MMHG | OXYGEN SATURATION: 99 % | TEMPERATURE: 98 F

## 2023-11-11 VITALS — HEIGHT: 62 IN | WEIGHT: 195.11 LBS

## 2023-11-11 DIAGNOSIS — E78.5 HYPERLIPIDEMIA, UNSPECIFIED: ICD-10-CM

## 2023-11-11 DIAGNOSIS — K08.89 OTHER SPECIFIED DISORDERS OF TEETH AND SUPPORTING STRUCTURES: ICD-10-CM

## 2023-11-11 DIAGNOSIS — I10 ESSENTIAL (PRIMARY) HYPERTENSION: ICD-10-CM

## 2023-11-11 DIAGNOSIS — K04.7 PERIAPICAL ABSCESS WITHOUT SINUS: ICD-10-CM

## 2023-11-11 LAB
ALBUMIN SERPL ELPH-MCNC: 3.7 G/DL — SIGNIFICANT CHANGE UP (ref 3.3–5)
ALBUMIN SERPL ELPH-MCNC: 3.7 G/DL — SIGNIFICANT CHANGE UP (ref 3.3–5)
ALP SERPL-CCNC: 98 U/L — SIGNIFICANT CHANGE UP (ref 40–120)
ALP SERPL-CCNC: 98 U/L — SIGNIFICANT CHANGE UP (ref 40–120)
ALT FLD-CCNC: 41 U/L — SIGNIFICANT CHANGE UP (ref 12–78)
ALT FLD-CCNC: 41 U/L — SIGNIFICANT CHANGE UP (ref 12–78)
ANION GAP SERPL CALC-SCNC: 7 MMOL/L — SIGNIFICANT CHANGE UP (ref 5–17)
ANION GAP SERPL CALC-SCNC: 7 MMOL/L — SIGNIFICANT CHANGE UP (ref 5–17)
APTT BLD: 33.5 SEC — SIGNIFICANT CHANGE UP (ref 24.5–35.6)
APTT BLD: 33.5 SEC — SIGNIFICANT CHANGE UP (ref 24.5–35.6)
AST SERPL-CCNC: 29 U/L — SIGNIFICANT CHANGE UP (ref 15–37)
AST SERPL-CCNC: 29 U/L — SIGNIFICANT CHANGE UP (ref 15–37)
BASOPHILS # BLD AUTO: 0.04 K/UL — SIGNIFICANT CHANGE UP (ref 0–0.2)
BASOPHILS # BLD AUTO: 0.04 K/UL — SIGNIFICANT CHANGE UP (ref 0–0.2)
BASOPHILS NFR BLD AUTO: 0.4 % — SIGNIFICANT CHANGE UP (ref 0–2)
BASOPHILS NFR BLD AUTO: 0.4 % — SIGNIFICANT CHANGE UP (ref 0–2)
BILIRUB SERPL-MCNC: 0.4 MG/DL — SIGNIFICANT CHANGE UP (ref 0.2–1.2)
BILIRUB SERPL-MCNC: 0.4 MG/DL — SIGNIFICANT CHANGE UP (ref 0.2–1.2)
BUN SERPL-MCNC: 7 MG/DL — SIGNIFICANT CHANGE UP (ref 7–23)
BUN SERPL-MCNC: 7 MG/DL — SIGNIFICANT CHANGE UP (ref 7–23)
CALCIUM SERPL-MCNC: 9 MG/DL — SIGNIFICANT CHANGE UP (ref 8.5–10.1)
CALCIUM SERPL-MCNC: 9 MG/DL — SIGNIFICANT CHANGE UP (ref 8.5–10.1)
CHLORIDE SERPL-SCNC: 105 MMOL/L — SIGNIFICANT CHANGE UP (ref 96–108)
CHLORIDE SERPL-SCNC: 105 MMOL/L — SIGNIFICANT CHANGE UP (ref 96–108)
CO2 SERPL-SCNC: 27 MMOL/L — SIGNIFICANT CHANGE UP (ref 22–31)
CO2 SERPL-SCNC: 27 MMOL/L — SIGNIFICANT CHANGE UP (ref 22–31)
CREAT SERPL-MCNC: 0.71 MG/DL — SIGNIFICANT CHANGE UP (ref 0.5–1.3)
CREAT SERPL-MCNC: 0.71 MG/DL — SIGNIFICANT CHANGE UP (ref 0.5–1.3)
EGFR: 103 ML/MIN/1.73M2 — SIGNIFICANT CHANGE UP
EGFR: 103 ML/MIN/1.73M2 — SIGNIFICANT CHANGE UP
EOSINOPHIL # BLD AUTO: 0.05 K/UL — SIGNIFICANT CHANGE UP (ref 0–0.5)
EOSINOPHIL # BLD AUTO: 0.05 K/UL — SIGNIFICANT CHANGE UP (ref 0–0.5)
EOSINOPHIL NFR BLD AUTO: 0.5 % — SIGNIFICANT CHANGE UP (ref 0–6)
EOSINOPHIL NFR BLD AUTO: 0.5 % — SIGNIFICANT CHANGE UP (ref 0–6)
ERYTHROCYTE [SEDIMENTATION RATE] IN BLOOD: 21 MM/HR — HIGH (ref 0–20)
ERYTHROCYTE [SEDIMENTATION RATE] IN BLOOD: 21 MM/HR — HIGH (ref 0–20)
GLUCOSE SERPL-MCNC: 104 MG/DL — HIGH (ref 70–99)
GLUCOSE SERPL-MCNC: 104 MG/DL — HIGH (ref 70–99)
HCG SERPL-ACNC: 2 MIU/ML — SIGNIFICANT CHANGE UP
HCG SERPL-ACNC: 2 MIU/ML — SIGNIFICANT CHANGE UP
HCT VFR BLD CALC: 43.2 % — SIGNIFICANT CHANGE UP (ref 34.5–45)
HCT VFR BLD CALC: 43.2 % — SIGNIFICANT CHANGE UP (ref 34.5–45)
HGB BLD-MCNC: 13.6 G/DL — SIGNIFICANT CHANGE UP (ref 11.5–15.5)
HGB BLD-MCNC: 13.6 G/DL — SIGNIFICANT CHANGE UP (ref 11.5–15.5)
IMM GRANULOCYTES NFR BLD AUTO: 0.2 % — SIGNIFICANT CHANGE UP (ref 0–0.9)
IMM GRANULOCYTES NFR BLD AUTO: 0.2 % — SIGNIFICANT CHANGE UP (ref 0–0.9)
INR BLD: 0.94 RATIO — SIGNIFICANT CHANGE UP (ref 0.85–1.18)
INR BLD: 0.94 RATIO — SIGNIFICANT CHANGE UP (ref 0.85–1.18)
LACTATE SERPL-SCNC: 0.9 MMOL/L — SIGNIFICANT CHANGE UP (ref 0.7–2)
LACTATE SERPL-SCNC: 0.9 MMOL/L — SIGNIFICANT CHANGE UP (ref 0.7–2)
LYMPHOCYTES # BLD AUTO: 1.93 K/UL — SIGNIFICANT CHANGE UP (ref 1–3.3)
LYMPHOCYTES # BLD AUTO: 1.93 K/UL — SIGNIFICANT CHANGE UP (ref 1–3.3)
LYMPHOCYTES # BLD AUTO: 20.2 % — SIGNIFICANT CHANGE UP (ref 13–44)
LYMPHOCYTES # BLD AUTO: 20.2 % — SIGNIFICANT CHANGE UP (ref 13–44)
MCHC RBC-ENTMCNC: 27.5 PG — SIGNIFICANT CHANGE UP (ref 27–34)
MCHC RBC-ENTMCNC: 27.5 PG — SIGNIFICANT CHANGE UP (ref 27–34)
MCHC RBC-ENTMCNC: 31.5 GM/DL — LOW (ref 32–36)
MCHC RBC-ENTMCNC: 31.5 GM/DL — LOW (ref 32–36)
MCV RBC AUTO: 87.4 FL — SIGNIFICANT CHANGE UP (ref 80–100)
MCV RBC AUTO: 87.4 FL — SIGNIFICANT CHANGE UP (ref 80–100)
MONOCYTES # BLD AUTO: 0.66 K/UL — SIGNIFICANT CHANGE UP (ref 0–0.9)
MONOCYTES # BLD AUTO: 0.66 K/UL — SIGNIFICANT CHANGE UP (ref 0–0.9)
MONOCYTES NFR BLD AUTO: 6.9 % — SIGNIFICANT CHANGE UP (ref 2–14)
MONOCYTES NFR BLD AUTO: 6.9 % — SIGNIFICANT CHANGE UP (ref 2–14)
NEUTROPHILS # BLD AUTO: 6.85 K/UL — SIGNIFICANT CHANGE UP (ref 1.8–7.4)
NEUTROPHILS # BLD AUTO: 6.85 K/UL — SIGNIFICANT CHANGE UP (ref 1.8–7.4)
NEUTROPHILS NFR BLD AUTO: 71.8 % — SIGNIFICANT CHANGE UP (ref 43–77)
NEUTROPHILS NFR BLD AUTO: 71.8 % — SIGNIFICANT CHANGE UP (ref 43–77)
PLATELET # BLD AUTO: 267 K/UL — SIGNIFICANT CHANGE UP (ref 150–400)
PLATELET # BLD AUTO: 267 K/UL — SIGNIFICANT CHANGE UP (ref 150–400)
POTASSIUM SERPL-MCNC: 4.2 MMOL/L — SIGNIFICANT CHANGE UP (ref 3.5–5.3)
POTASSIUM SERPL-MCNC: 4.2 MMOL/L — SIGNIFICANT CHANGE UP (ref 3.5–5.3)
POTASSIUM SERPL-SCNC: 4.2 MMOL/L — SIGNIFICANT CHANGE UP (ref 3.5–5.3)
POTASSIUM SERPL-SCNC: 4.2 MMOL/L — SIGNIFICANT CHANGE UP (ref 3.5–5.3)
PROT SERPL-MCNC: 7.8 GM/DL — SIGNIFICANT CHANGE UP (ref 6–8.3)
PROT SERPL-MCNC: 7.8 GM/DL — SIGNIFICANT CHANGE UP (ref 6–8.3)
PROTHROM AB SERPL-ACNC: 10.6 SEC — SIGNIFICANT CHANGE UP (ref 9.5–13)
PROTHROM AB SERPL-ACNC: 10.6 SEC — SIGNIFICANT CHANGE UP (ref 9.5–13)
RBC # BLD: 4.94 M/UL — SIGNIFICANT CHANGE UP (ref 3.8–5.2)
RBC # BLD: 4.94 M/UL — SIGNIFICANT CHANGE UP (ref 3.8–5.2)
RBC # FLD: 14.6 % — HIGH (ref 10.3–14.5)
RBC # FLD: 14.6 % — HIGH (ref 10.3–14.5)
SODIUM SERPL-SCNC: 139 MMOL/L — SIGNIFICANT CHANGE UP (ref 135–145)
SODIUM SERPL-SCNC: 139 MMOL/L — SIGNIFICANT CHANGE UP (ref 135–145)
WBC # BLD: 9.55 K/UL — SIGNIFICANT CHANGE UP (ref 3.8–10.5)
WBC # BLD: 9.55 K/UL — SIGNIFICANT CHANGE UP (ref 3.8–10.5)
WBC # FLD AUTO: 9.55 K/UL — SIGNIFICANT CHANGE UP (ref 3.8–10.5)
WBC # FLD AUTO: 9.55 K/UL — SIGNIFICANT CHANGE UP (ref 3.8–10.5)

## 2023-11-11 PROCEDURE — 84702 CHORIONIC GONADOTROPIN TEST: CPT

## 2023-11-11 PROCEDURE — 36415 COLL VENOUS BLD VENIPUNCTURE: CPT

## 2023-11-11 PROCEDURE — 85652 RBC SED RATE AUTOMATED: CPT

## 2023-11-11 PROCEDURE — 85730 THROMBOPLASTIN TIME PARTIAL: CPT

## 2023-11-11 PROCEDURE — 70487 CT MAXILLOFACIAL W/DYE: CPT | Mod: 26,MA

## 2023-11-11 PROCEDURE — 70487 CT MAXILLOFACIAL W/DYE: CPT | Mod: MA

## 2023-11-11 PROCEDURE — 85025 COMPLETE CBC W/AUTO DIFF WBC: CPT

## 2023-11-11 PROCEDURE — 99285 EMERGENCY DEPT VISIT HI MDM: CPT | Mod: 25

## 2023-11-11 PROCEDURE — 99284 EMERGENCY DEPT VISIT MOD MDM: CPT | Mod: 25

## 2023-11-11 PROCEDURE — 83605 ASSAY OF LACTIC ACID: CPT

## 2023-11-11 PROCEDURE — 80053 COMPREHEN METABOLIC PANEL: CPT

## 2023-11-11 PROCEDURE — 85610 PROTHROMBIN TIME: CPT

## 2023-11-11 PROCEDURE — 41800 DRAINAGE OF GUM LESION: CPT

## 2023-11-11 PROCEDURE — 87040 BLOOD CULTURE FOR BACTERIA: CPT

## 2023-11-11 PROCEDURE — 96374 THER/PROPH/DIAG INJ IV PUSH: CPT | Mod: XU

## 2023-11-11 RX ORDER — SODIUM CHLORIDE 9 MG/ML
1000 INJECTION INTRAMUSCULAR; INTRAVENOUS; SUBCUTANEOUS ONCE
Refills: 0 | Status: COMPLETED | OUTPATIENT
Start: 2023-11-11 | End: 2023-11-11

## 2023-11-11 RX ADMIN — Medication 100 MILLIGRAM(S): at 13:51

## 2023-11-11 RX ADMIN — SODIUM CHLORIDE 1000 MILLILITER(S): 9 INJECTION INTRAMUSCULAR; INTRAVENOUS; SUBCUTANEOUS at 13:52

## 2023-11-11 NOTE — ED ADULT TRIAGE NOTE - CHIEF COMPLAINT QUOTE
Pt arrives to ED complaining of right sided dental pain for three days and now has swelling to right side of face. Pt complains of fevers. Hx htn, HLD

## 2023-11-11 NOTE — ED STATDOCS - OBJECTIVE STATEMENT
54 y/o female with PMHx of HTN. HLD; presents to the ED c/o right sided facial pain and swelling x1.5 week. Denies fever and chills

## 2023-11-11 NOTE — ED STATDOCS - PATIENT PORTAL LINK FT
You can access the FollowMyHealth Patient Portal offered by [facility] by registering at the following website: [email or web address]. By joining [employer]’s FollowMyHealth portal, you will also be able to view your health information using other applications (apps) compatible with our system.

## 2023-11-11 NOTE — ED STATDOCS - ATTENDING CONTRIBUTION TO CARE
Dr. North: I performed a face to face bedside interview with patient regarding history of present illness, review of symptoms and past medical history. I completed an independent physical exam.  I have discussed patient's plan of care with resident.   I agree with note as stated above, having amended the EMR as needed to reflect my findings.   This includes HISTORY OF PRESENT ILLNESS, HIV, PAST MEDICAL/SURGICAL/FAMILY/SOCIAL HISTORY, ALLERGIES AND HOME MEDICATIONS, REVIEW OF SYSTEMS, PHYSICAL EXAM, and any PROGRESS NOTES during the time I functioned as the attending physician for this patient.  PACO North DO  Gen:  Well appearing in NAD  Head:  NC/AT  HEENT: pupils perrl,no pharyngeal erythema, uvula midline, right sided facial swelling  Cardiac: S1S2, RRR  Abd: Soft, non tender  Resp: No distress, CTA   musculoskeletal:: no deformities, no swelling, strength +5/+5  Skin: warm and dry as visualized, no rashes  Neuro: MARVIN, aao x 4  Psych:alert, cooperative, appropriate mood and affect for situation

## 2023-11-11 NOTE — ED STATDOCS - NSFOLLOWUPINSTRUCTIONS_ED_ALL_ED_FT
take antibiotics as prescribed    follow up with a dental clinic below      [facility]  [phone] [address] [place] [address]    or       [facility]  [phone] [address] [address]      Absceso dental    LO QUE NECESITA SABER:    Un absceso dental es la acumulación de pus adentro o alrededor de un diente. La bacteria es la causa de un absceso dental. Las bacterias pueden entrar al diente cuando el esmalte (parte exterior del diente) se daña debido a la caries dental. Las bacterias también pueden entrar el diente a través de un astillamiento en el diente o un bryce en la encía. Las partículas de alimento que se quedan atoradas entre los dientes por un tiempo extendido podrían también llevar a la formación de un absceso.  Absceso dental    INSTRUCCIONES SOBRE EL SAMIR HOSPITALARIA:    Regrese a la corey de emergencias si:    Siente mucho dolor en el diente o la mandíbula.    Usted tiene dificultad para respirar a causa del dolor o la inflamación.  Llame a espinoza médico si:    Graciela síntomas empeoran, aún después del tratamiento.    Sangra espinoza boca.    No puede comer o beber a causa del dolor o la inflamación.    El absceso se vuelve a formar.    Usted tiene mann lesión que causa mann grieta en espinoza diente.    Usted tiene preguntas o inquietudes acerca de espinoza condición o cuidado.  Medicamentos:Es posible que usted necesite alguno de los siguientes:    Los antibióticosayudan a tratar mann infección bacteriana.    AINEcomo el ibuprofeno, ayudan a disminuir la inflamación, el dolor y la fiebre. Elia medicamento está disponible con o sin mann receta médica. Los KENIA pueden causar sangrado estomacal o problemas renales en ciertas personas. Si usted kait un medicamento anticoagulante, siempre pregúntele a espinoza médico si los KENIA son seguros para usted. Siempre sylvia la etiqueta de elia medicamento y siga las instrucciones.    Acetaminofénalivia el dolor y baja la fiebre. Está disponible sin receta médica. Pregunte la cantidad y la frecuencia con que debe tomarlos. Siga las indicaciones. Sylvia las etiquetas de todos los demás medicamentos que esté usando para saber si también contienen acetaminofén, o pregunte a espinoza médico o farmacéutico. El acetaminofén puede causar daño en el hígado cuando no se kait de forma correcta.    Puede administrarsepodrían administrarse. Pregunte al médico cómo debe tomasz elia medicamento de forma solitario. Algunos medicamentos recetados para el dolor contienen acetaminofén. No tome otros medicamentos que contengan acetaminofén sin consultarlo con espinoza médico. Demasiado acetaminofeno puede causar daño al hígado. Los medicamentos recetados para el dolor podrían causar estreñimiento. Pregunte a espinoza médico ke prevenir o tratar estreñimiento.    [place] graciela medicamentos ke se le haya indicado.Consulte con espinoza médico si usted daniel que espinoza medicamento no le está ayudando o si presenta efectos secundarios. Infórmele al médico si usted es alérgico a algún medicamento. Mantenga mann lista actualizada de los medicamentos, las vitaminas y los productos herbales que kait. Incluya los siguientes datos de los medicamentos: cantidad, frecuencia y motivo de administración. Traiga con usted la lista o los envases de las píldoras a graciela citas de seguimiento. Lleve la lista de los medicamentos con usted en renee de mann emergencia.  Cuidados personales:    Enjuáguese la boca cada 2 horas con agua salina.[place] ayudará a mantener el área limpia.    [place] suavemente graciela dientes dos veces al día con un cepillo de dientes suave.[place] ayudará a mantener el área limpia.    Coma alimentos blandos ke se le indique.Los alimentos blandos pueden causar menos dolor. Por ejemplo, compota de manzana, yogur y pasta cocida. Pregunte a espinoza médico por cuánto tiempo necesita seguir estas indicaciones.    Aplique mann compresa caliente en el diente o la encía.Utilice mann alegria de algodón o mann gasa empapada en agua tibia. Quite la compresa en 10 minutos o cuando se enfríe. [place] J esto 3 veces al día.  Prevenir otro absceso:    Cepíllese los dientes por lo menos 2 veces al díacon pasta dental con flúor.    Use hilo dental al menos mann vez al díapara limpiar entre los dientes.    Enjuáguese la boca con agua o enjuague bucaldespués de las comidas y meriendas. Masque goma de mascar sin azúcar.    Evite los alimentos que contienen azúcar y almidón que pueden pegarse entre los dientes.Limite las bebidas con alto contenido de azúcar, ke las bebidas gaseosas o los jugos de frutas.    Visite al dentista cada 6 mesespara limpiezas dentales y exámenes orales.  Acuda a mann consulta de control con espinoza médico o dentista dentro de 24 horas, o según le hayan indicado:Espinoza médico revisará los dientes y las encías. [place] graciela preguntas para que se acuerde de hacerlas nader graciela visitas. take antibiotics as prescribed    follow up with a dental clinic below      [facility]  [phone]  [address] [place] [address]    or       [facility]  [phone] [address] [address]    also please review CT findings with your primary care doctor for incidental findings on parotid gland    También revise los resultados de la tomografía computarizada con espinoza médico de atención primaria para detectar hallazgos incidentales en la glándula parótida.      Incidental diffuse heterogeneity of the parotid glands suggests chronic   inflammation of broad differential diagnostic potential, potentially   autoimmune.      Absceso dental    LO QUE NECESITA SABER:    Un absceso dental es la acumulación de pus adentro o alrededor de un diente. La bacteria es la causa de un absceso dental. Las bacterias pueden entrar al diente cuando el esmalte (parte exterior del diente) se daña debido a la caries dental. Las bacterias también pueden entrar el diente a través de un astillamiento en el diente o un bryce en la encía. Las partículas de alimento que se quedan atoradas entre los dientes por un tiempo extendido podrían también llevar a la formación de un absceso.  Absceso dental    INSTRUCCIONES SOBRE EL SAMIR HOSPITALARIA:    Regrese a la corey de emergencias si:    Siente mucho dolor en el diente o la mandíbula.    Usted tiene dificultad para respirar a causa del dolor o la inflamación.  Llame a espinoza médico si:    Graciela síntomas empeoran, aún después del tratamiento.    Sangra espinoza boca.    No puede comer o beber a causa del dolor o la inflamación.    El absceso se vuelve a formar.    Usted tiene mann lesión que causa mann grieta en espinoza diente.    Usted tiene preguntas o inquietudes acerca de espinoza condición o cuidado.  Medicamentos:Es posible que usted necesite alguno de los siguientes:    Los antibióticosayudan a tratar mann infección bacteriana.    AINEcomo el ibuprofeno, ayudan a disminuir la inflamación, el dolor y la fiebre. Elia medicamento está disponible con o sin mann receta médica. Los KENIA pueden causar sangrado estomacal o problemas renales en ciertas personas. Si usted kait un medicamento anticoagulante, siempre pregúntele a espinoza médico si los KENIA son seguros para usted. Siempre sylvia la etiqueta de elia medicamento y siga las instrucciones.    Acetaminofénalivia el dolor y baja la fiebre. Está disponible sin receta médica. Pregunte la cantidad y la frecuencia con que debe tomarlos. Siga las indicaciones. Sylvia las etiquetas de todos los demás medicamentos que esté usando para saber si también contienen acetaminofén, o pregunte a espinoza médico o farmacéutico. El acetaminofén puede causar daño en el hígado cuando no se kait de forma correcta.    Puede administrarsepodrían administrarse. Pregunte al médico cómo debe tomasz elia medicamento de forma solitario. Algunos medicamentos recetados para el dolor contienen acetaminofén. No tome otros medicamentos que contengan acetaminofén sin consultarlo con espinoza médico. Demasiado acetaminofeno puede causar daño al hígado. Los medicamentos recetados para el dolor podrían causar estreñimiento. Pregunte a espinoza médico ke prevenir o tratar estreñimiento.    [place] graciela medicamentos ke se le haya indicado.Consulte con espinoza médico si usted daniel que espinoza medicamento no le está ayudando o si presenta efectos secundarios. Infórmele al médico si usted es alérgico a algún medicamento. Mantenga mann lista actualizada de los medicamentos, las vitaminas y los productos herbales que kait. Incluya los siguientes datos de los medicamentos: cantidad, frecuencia y motivo de administración. Traiga con usted la lista o los envases de las píldoras a graciela citas de seguimiento. Lleve la lista de los medicamentos con usted en renee de mann emergencia.  Cuidados personales:    Enjuáguese la boca cada 2 horas con agua salina.[place] ayudará a mantener el área limpia.    [place] suavemente graciela dientes dos veces al día con un cepillo de dientes suave.[place] ayudará a mantener el área limpia.    Coma alimentos blandos ke se le indique.Los alimentos blandos pueden causar menos dolor. Por ejemplo, compota de manzana, yogur y pasta cocida. Pregunte a espinoza médico por cuánto tiempo necesita seguir estas indicaciones.    Aplique mann compresa caliente en el diente o la encía.Utilice mann alegria de algodón o mann gasa empapada en agua tibia. Quite la compresa en 10 minutos o cuando se enfríe. [place] J esto 3 veces al día.  Prevenir otro absceso:    Cepíllese los dientes por lo menos 2 veces al díacon pasta dental con flúor.    Use hilo dental al menos mann vez al díapara limpiar entre los dientes.    Enjuáguese la boca con agua o enjuague bucaldespués de las comidas y meriendas. Masque goma de mascar sin azúcar.    Evite los alimentos que contienen azúcar y almidón que pueden pegarse entre los dientes.Limite las bebidas con alto contenido de azúcar, ke las bebidas gaseosas o los jugos de frutas.    Visite al dentista cada 6 mesespara limpiezas dentales y exámenes orales.  Acuda a mann consulta de control con espinoza médico o dentista dentro de 24 horas, o según le hayan indicado:Espinoza médico revisará los dientes y las encías. Anote graciela preguntas para que se acuerde de hacerlas nader graceila visitas.

## 2023-11-11 NOTE — ED STATDOCS - PHYSICAL EXAMINATION
Gen:  Well appearing in NAD  Head:  NC/AT  HEENT: pupils perrl, no pharyngeal erythema, uvula midline, right side of face swelling including periorbital swelling, right upper molar some missing others with severe decay. No crepitus  Cardiac: S1S2, RRR  Abd: Soft, non tender  Resp: No distress, CTA   musculoskeletal:: no deformities, no swelling, strength +5/+5  Skin: warm and dry as visualized, no rashes  Neuro: MARVIN, aao x 4  Psych: alert, cooperative, appropriate mood and affect for situation

## 2023-11-11 NOTE — ED STATDOCS - PROGRESS NOTE DETAILS
Labs and imaging independently reviewed and interpreted with note of dental abscess. I&D tolerated well. Conversation had with patient regarding results of lab work and imaging. Patient agrees with plan for discharge at this time. Patient agrees to comply with follow up to dentist. Return to ED precautions and discharge instructions discussed with patient with verbal understanding. -DO Oseas

## 2023-11-11 NOTE — ED PROCEDURE NOTE - ATTENDING CONTRIBUTION TO CARE
Dr. North: I performed a face to face bedside interview with patient regarding history of present illness, review of symptoms and past medical history. I completed an independent physical exam.  I have discussed patient's plan of care with resident.   I agree with note as stated above, having amended the EMR as needed to reflect my findings.   This includes HISTORY OF PRESENT ILLNESS, HIV, PAST MEDICAL/SURGICAL/FAMILY/SOCIAL HISTORY, ALLERGIES AND HOME MEDICATIONS, REVIEW OF SYSTEMS, PHYSICAL EXAM, and any PROGRESS NOTES during the time I functioned as the attending physician for this patient.  PACO North DO

## 2023-11-11 NOTE — ED STATDOCS - NS ED ATTENDING STATEMENT MOD
I have seen and examined this patient and fully participated in the care of this patient as the teaching attending.  The service was shared with the ROD.  I reviewed and verified the documentation and independently performed the documented:

## 2023-11-16 LAB
CULTURE RESULTS: SIGNIFICANT CHANGE UP
SPECIMEN SOURCE: SIGNIFICANT CHANGE UP

## 2023-12-27 ENCOUNTER — APPOINTMENT (OUTPATIENT)
Dept: VASCULAR SURGERY | Facility: CLINIC | Age: 53
End: 2023-12-27
Payer: MEDICAID

## 2023-12-27 VITALS
HEIGHT: 61 IN | DIASTOLIC BLOOD PRESSURE: 83 MMHG | OXYGEN SATURATION: 99 % | WEIGHT: 196 LBS | SYSTOLIC BLOOD PRESSURE: 133 MMHG | BODY MASS INDEX: 37 KG/M2 | HEART RATE: 78 BPM | RESPIRATION RATE: 16 BRPM

## 2023-12-27 PROCEDURE — 99212 OFFICE O/P EST SF 10 MIN: CPT

## 2023-12-27 PROCEDURE — 93971 EXTREMITY STUDY: CPT | Mod: LT

## 2023-12-29 NOTE — HISTORY OF PRESENT ILLNESS
[FreeTextEntry1] : 54yo female with chronic venous insufficiency and symptomatic varicose veins now with wound that developed after an injury/laceration to the medial right ankle -she will need a venous duplex with VI study to better assess function of veins (will plan for next week) -unna boot dressing applied to stay on for 1 week until next visit -patient will not be able to work for at least few weeks until wound is completely healed as standing with propagate her symptoms and delay healing  09/14/2023: right GSV RFA [de-identified] : Since her procedure she has been compliant with compression stockings and now notes that her contralateral leg is have symptoms of painful vaircose veins, swelling, aching and muscle cramps. The compression stockings help but she is still symptomatic even when wearing them daily. She is still working in food services and the symptoms are interfering with her ADL's

## 2023-12-29 NOTE — ASSESSMENT
[FreeTextEntry1] : Ms. JASMYN DEXTER is a 53 year with persistent now LEFT lower extremity venous insufficiency, CEAP classification C-3 which is ___ unresponsive to at least 3 months of compression stockings 20-30 mmHg, leg elevation, exercise and over the counter pain medication_(Ibuprofen).  Patient has complaints of worsening _ leg discomfort with swelling, fatigue, heaviness, achiness, cramping,  and painful varicosities.  The patients symptoms interfere with their ADLs, such as their ability to work and exercise__. Patient has intact pulses in both legs without evidence of arterial insufficiency.     Patient is a candidate for endovenous ablation treatment of the LEFT GSV The risks, benefits and alternatives treatment versus continued conservative management were discussed with the patient.  Patient chooses endovenous ablation __treatments. Treatment plan to be scheduled.

## 2024-01-31 ENCOUNTER — APPOINTMENT (OUTPATIENT)
Dept: VASCULAR SURGERY | Facility: CLINIC | Age: 54
End: 2024-01-31

## 2024-02-05 NOTE — HISTORY OF PRESENT ILLNESS
[FreeTextEntry1] : 54yo female with known longstanding chronic venous insufficiency and varicose veins b/l comes in for a new wound on the right leg. Patient was at work and had a laceration to her right medial ankle and this has formed a wound. She went to the ED and they did wound care and put sutures in the area. Since that time she has developed a wound in the area and has been doing her own local wound care. She has pain in the area but denies fowl smelling drainage, fever and chills. Previous venous procedure includes what sounds like an ablation of the GSV on the left leg; has never had an intervention on the right leg. Wears compression stockings regularly but has stopped given this new injury and wound.  [de-identified] : Tolerated unna boot without fevers chills, pain is minimal, no drainage or malodor overall doing well

## 2024-02-05 NOTE — ASSESSMENT
[FreeTextEntry1] : 52yo female with chronic venous insufficiency and symptomatic varicose veins now with wound that developed after an injury/laceration to the medial right ankle -unna boot dressing applied to stay on for 1 week until next visit

## 2024-02-05 NOTE — PHYSICAL EXAM
[FreeTextEntry1] : . Femoral: right 2+ and left 2+. Popliteal: right 2+ and left 2+. Posterior tibialis: right 2+ and left 2+. Dorsalis pedis: right 2+ and left 2+. Right medial malleolar wound deep to dermis; clean base no drainage;

## 2024-02-16 RX ORDER — LIDOCAINE HYDROCHLORIDE 10 MG/ML
1 INJECTION, SOLUTION INFILTRATION; PERINEURAL
Qty: 50 | Refills: 0 | Status: ACTIVE | COMMUNITY
Start: 2024-02-09 | End: 1900-01-01

## 2024-02-16 RX ORDER — SODIUM BICARBONATE 84 MG/ML
8.4 INJECTION, SOLUTION INTRAVENOUS
Qty: 5 | Refills: 0 | Status: ACTIVE | COMMUNITY
Start: 2024-02-09 | End: 1900-01-01

## 2024-02-27 ENCOUNTER — APPOINTMENT (OUTPATIENT)
Dept: VASCULAR SURGERY | Facility: CLINIC | Age: 54
End: 2024-02-27
Payer: COMMERCIAL

## 2024-02-27 PROCEDURE — 36475 ENDOVENOUS RF 1ST VEIN: CPT | Mod: LT

## 2024-02-27 NOTE — PROCEDURE
[FreeTextEntry1] : left great saphenous vein radiofrequency ablation [FreeTextEntry2] : symptomatic varicose veins [FreeTextEntry3] :  Review of medical records, including venous ultrasound, noting correct procedure including site and side. The provider verifies presence and review of imaging studies and/or written report of imaging studies. Agreement on the procedure to be performed.  Time out completed. All of the above has been confirmed by the team. All patient-specific concerns have been addressed.  Indication:  Symptomatic venous insufficiency/ reflux. Procedure: radiofrequency ablation of the left great saphenous vein.    The patient was escorted into the procedure room and a time out called.  The entire limb was prepped and draped in sterile fashion. The RF fiber was placed on the sterile field and connected by a sterile cable. Actuation, temperature, and impedance testing were performed to ensure that all components were connected and operating properly. The patient was placed on the procedure table and local anesthesia was instilled in the skin overlying the access site. Under ultrasound guidance, the vein was punctured with a micropuncture needle, using the anterior wall technique. A guide wire was now introduced through the needle, and the needle was then exchanged over the guide wire for a 7F sheath. The guide wire was removed, and the RF probe was then placed into the great saphenous vein through the sheath and position confirmed using ultrasound guidance. After the RF probe position was verified by ultrasound, tumescent anesthesia consisting of normal saline, 1% lidocaine with 8.4% sodium bicarbonate was infiltrated, under ultrasound guidance, precisely into the perivenous compartment along the entire length of the vein until a halo of fluid was noted around the vein. After RF probe position was again confirmed with ultrasound imaging, RF energy was applied. The probe was gradually and carefully withdrawn at a rate of 6.5cm/20seconds.  7 cycles of RF performed  Total RFA treatment time was 2.4min  Treatment length was 45cm     Repeat ultrasound of the treated vein was performed confirming successful treatment. The catheter and sheath were withdrawn, and hemostasis established with direct pressure. After assuring hemostasis, a sterile 4x4 was placed on the access site and an ACE compression wrap was applied. Patient tolerated procedure well. Patient was given post-procedure instructions and follow up appointment was scheduled.

## 2024-03-05 ENCOUNTER — APPOINTMENT (OUTPATIENT)
Dept: VASCULAR SURGERY | Facility: CLINIC | Age: 54
End: 2024-03-05
Payer: COMMERCIAL

## 2024-03-05 PROCEDURE — 93971 EXTREMITY STUDY: CPT | Mod: LT

## 2024-03-18 ENCOUNTER — APPOINTMENT (OUTPATIENT)
Dept: VASCULAR SURGERY | Facility: CLINIC | Age: 54
End: 2024-03-18

## 2024-04-01 ENCOUNTER — APPOINTMENT (OUTPATIENT)
Dept: VASCULAR SURGERY | Facility: CLINIC | Age: 54
End: 2024-04-01
Payer: COMMERCIAL

## 2024-04-01 VITALS — SYSTOLIC BLOOD PRESSURE: 155 MMHG | HEART RATE: 62 BPM | DIASTOLIC BLOOD PRESSURE: 89 MMHG

## 2024-04-01 DIAGNOSIS — L97.909 VARICOSE VEINS OF UNSPECIFIED LOWER EXTREMITY WITH BOTH ULCER OF UNSPECIFIED SITE AND INFLAMMATION: ICD-10-CM

## 2024-04-01 DIAGNOSIS — I83.209 VARICOSE VEINS OF UNSPECIFIED LOWER EXTREMITY WITH BOTH ULCER OF UNSPECIFIED SITE AND INFLAMMATION: ICD-10-CM

## 2024-04-01 PROCEDURE — 99212 OFFICE O/P EST SF 10 MIN: CPT

## 2024-04-01 RX ORDER — METOPROLOL TARTRATE 75 MG/1
TABLET, FILM COATED ORAL
Refills: 0 | Status: ACTIVE | COMMUNITY

## 2024-04-01 RX ORDER — ATORVASTATIN CALCIUM 80 MG/1
TABLET, FILM COATED ORAL
Refills: 0 | Status: ACTIVE | COMMUNITY

## 2024-04-01 NOTE — HISTORY OF PRESENT ILLNESS
[FreeTextEntry1] : 54yo female with chronic venous insufficiency and symptomatic varicose veins now with wound that developed after an injury/laceration to the medial right ankle -she will need a venous duplex with VI study to better assess function of veins (will plan for next week) -unna boot dressing applied to stay on for 1 week until next visit -patient will not be able to work for at least few weeks until wound is completely healed as standing with propagate her symptoms and delay healing  09/14/2023: right GSV RFA  02/27/24: left GSV RFA [de-identified] : doing well all leg symptoms resolved and improved; area of pervious VLU on the right medial malleolus still sensative to touch; wears compression stockings intermittently PRN

## 2024-04-01 NOTE — ASSESSMENT
[FreeTextEntry1] : 55yo female with severe b/l CVI now s/p b/l RFA of GSV doing very well continue compression stockings 20-30mmHg  follow up prn

## 2024-04-01 NOTE — PHYSICAL EXAM
[2+] : right 2+ [de-identified] : well appearing [FreeTextEntry1] : LLE: no varicosities much improved RLE: CEAP C-5 well healed VLU some scatter small varicose veins